# Patient Record
Sex: FEMALE | Race: WHITE | HISPANIC OR LATINO | Employment: FULL TIME | URBAN - METROPOLITAN AREA
[De-identification: names, ages, dates, MRNs, and addresses within clinical notes are randomized per-mention and may not be internally consistent; named-entity substitution may affect disease eponyms.]

---

## 2019-09-19 ENCOUNTER — HOSPITAL ENCOUNTER (EMERGENCY)
Facility: MEDICAL CENTER | Age: 24
End: 2019-09-20
Attending: EMERGENCY MEDICINE
Payer: COMMERCIAL

## 2019-09-19 ENCOUNTER — APPOINTMENT (OUTPATIENT)
Dept: RADIOLOGY | Facility: MEDICAL CENTER | Age: 24
End: 2019-09-19
Attending: EMERGENCY MEDICINE
Payer: COMMERCIAL

## 2019-09-19 ENCOUNTER — HOSPITAL ENCOUNTER (OUTPATIENT)
Facility: MEDICAL CENTER | Age: 24
End: 2019-09-19
Attending: PHYSICIAN ASSISTANT
Payer: COMMERCIAL

## 2019-09-19 ENCOUNTER — OFFICE VISIT (OUTPATIENT)
Dept: URGENT CARE | Facility: CLINIC | Age: 24
End: 2019-09-19
Payer: COMMERCIAL

## 2019-09-19 VITALS
HEIGHT: 60 IN | DIASTOLIC BLOOD PRESSURE: 72 MMHG | OXYGEN SATURATION: 97 % | HEART RATE: 63 BPM | RESPIRATION RATE: 14 BRPM | WEIGHT: 132 LBS | BODY MASS INDEX: 25.91 KG/M2 | TEMPERATURE: 97.5 F | SYSTOLIC BLOOD PRESSURE: 102 MMHG

## 2019-09-19 DIAGNOSIS — R10.9 ABDOMINAL PAIN, UNSPECIFIED ABDOMINAL LOCATION: ICD-10-CM

## 2019-09-19 DIAGNOSIS — R10.9 RIGHT SIDED ABDOMINAL PAIN: ICD-10-CM

## 2019-09-19 DIAGNOSIS — R93.41 ABNORMAL CT SCAN, BLADDER: ICD-10-CM

## 2019-09-19 DIAGNOSIS — R82.90 ABNORMAL URINALYSIS: ICD-10-CM

## 2019-09-19 DIAGNOSIS — R10.30 LOWER ABDOMINAL PAIN: ICD-10-CM

## 2019-09-19 LAB
ALBUMIN SERPL BCP-MCNC: 4.7 G/DL (ref 3.2–4.9)
ALBUMIN/GLOB SERPL: 1.4 G/DL
ALP SERPL-CCNC: 51 U/L (ref 30–99)
ALT SERPL-CCNC: 10 U/L (ref 2–50)
ANION GAP SERPL CALC-SCNC: 7 MMOL/L (ref 0–11.9)
APPEARANCE UR: CLEAR
AST SERPL-CCNC: 12 U/L (ref 12–45)
BASOPHILS # BLD AUTO: 2.6 % (ref 0–1.8)
BASOPHILS # BLD: 0.25 K/UL (ref 0–0.12)
BILIRUB SERPL-MCNC: 0.3 MG/DL (ref 0.1–1.5)
BILIRUB UR STRIP-MCNC: ABNORMAL MG/DL
BUN SERPL-MCNC: 15 MG/DL (ref 8–22)
CALCIUM SERPL-MCNC: 9.3 MG/DL (ref 8.5–10.5)
CHLORIDE SERPL-SCNC: 104 MMOL/L (ref 96–112)
CO2 SERPL-SCNC: 27 MMOL/L (ref 20–33)
COLOR UR AUTO: YELLOW
CREAT SERPL-MCNC: 0.65 MG/DL (ref 0.5–1.4)
EOSINOPHIL # BLD AUTO: 0.16 K/UL (ref 0–0.51)
EOSINOPHIL NFR BLD: 1.7 % (ref 0–6.9)
ERYTHROCYTE [DISTWIDTH] IN BLOOD BY AUTOMATED COUNT: 35.1 FL (ref 35.9–50)
GLOBULIN SER CALC-MCNC: 3.4 G/DL (ref 1.9–3.5)
GLUCOSE SERPL-MCNC: 91 MG/DL (ref 65–99)
GLUCOSE UR STRIP.AUTO-MCNC: ABNORMAL MG/DL
HCT VFR BLD AUTO: 41.3 % (ref 37–47)
HGB BLD-MCNC: 13.9 G/DL (ref 12–16)
INT CON NEG: NEGATIVE
INT CON POS: POSITIVE
KETONES UR STRIP.AUTO-MCNC: ABNORMAL MG/DL
LEUKOCYTE ESTERASE UR QL STRIP.AUTO: ABNORMAL
LYMPHOCYTES # BLD AUTO: 3.12 K/UL (ref 1–4.8)
LYMPHOCYTES NFR BLD: 32.2 % (ref 22–41)
MANUAL DIFF BLD: NORMAL
MCH RBC QN AUTO: 27.3 PG (ref 27–33)
MCHC RBC AUTO-ENTMCNC: 33.7 G/DL (ref 33.6–35)
MCV RBC AUTO: 81.1 FL (ref 81.4–97.8)
MONOCYTES # BLD AUTO: 0.43 K/UL (ref 0–0.85)
MONOCYTES NFR BLD AUTO: 4.4 % (ref 0–13.4)
MORPHOLOGY BLD-IMP: NORMAL
NEUTROPHILS # BLD AUTO: 5.73 K/UL (ref 2–7.15)
NEUTROPHILS NFR BLD: 59.1 % (ref 44–72)
NITRITE UR QL STRIP.AUTO: ABNORMAL
NRBC # BLD AUTO: 0 K/UL
NRBC BLD-RTO: 0 /100 WBC
PH UR STRIP.AUTO: 7.5 [PH] (ref 5–8)
PLATELET # BLD AUTO: 331 K/UL (ref 164–446)
PLATELET BLD QL SMEAR: NORMAL
PMV BLD AUTO: 9.4 FL (ref 9–12.9)
POC URINE PREGNANCY TEST: NORMAL
POTASSIUM SERPL-SCNC: 3.7 MMOL/L (ref 3.6–5.5)
PROT SERPL-MCNC: 8.1 G/DL (ref 6–8.2)
PROT UR QL STRIP: ABNORMAL MG/DL
RBC # BLD AUTO: 5.09 M/UL (ref 4.2–5.4)
RBC BLD AUTO: NORMAL
RBC UR QL AUTO: ABNORMAL
SODIUM SERPL-SCNC: 138 MMOL/L (ref 135–145)
SP GR UR STRIP.AUTO: 1.02
UROBILINOGEN UR STRIP-MCNC: ABNORMAL MG/DL
WBC # BLD AUTO: 9.7 K/UL (ref 4.8–10.8)

## 2019-09-19 PROCEDURE — 74177 CT ABD & PELVIS W/CONTRAST: CPT

## 2019-09-19 PROCEDURE — 81025 URINE PREGNANCY TEST: CPT | Performed by: PHYSICIAN ASSISTANT

## 2019-09-19 PROCEDURE — 99284 EMERGENCY DEPT VISIT MOD MDM: CPT

## 2019-09-19 PROCEDURE — 99204 OFFICE O/P NEW MOD 45 MIN: CPT | Performed by: PHYSICIAN ASSISTANT

## 2019-09-19 PROCEDURE — 85027 COMPLETE CBC AUTOMATED: CPT

## 2019-09-19 PROCEDURE — 87186 SC STD MICRODIL/AGAR DIL: CPT

## 2019-09-19 PROCEDURE — 87086 URINE CULTURE/COLONY COUNT: CPT

## 2019-09-19 PROCEDURE — 87077 CULTURE AEROBIC IDENTIFY: CPT

## 2019-09-19 PROCEDURE — 85007 BL SMEAR W/DIFF WBC COUNT: CPT

## 2019-09-19 PROCEDURE — 81002 URINALYSIS NONAUTO W/O SCOPE: CPT | Performed by: PHYSICIAN ASSISTANT

## 2019-09-19 PROCEDURE — 80053 COMPREHEN METABOLIC PANEL: CPT

## 2019-09-19 PROCEDURE — 700117 HCHG RX CONTRAST REV CODE 255: Performed by: EMERGENCY MEDICINE

## 2019-09-19 RX ADMIN — IOHEXOL 100 ML: 350 INJECTION, SOLUTION INTRAVENOUS at 23:48

## 2019-09-19 ASSESSMENT — LIFESTYLE VARIABLES
TOTAL SCORE: 0
DO YOU DRINK ALCOHOL: NO
HAVE PEOPLE ANNOYED YOU BY CRITICIZING YOUR DRINKING: NO
HAVE YOU EVER FELT YOU SHOULD CUT DOWN ON YOUR DRINKING: NO
TOTAL SCORE: 0
CONSUMPTION TOTAL: INCOMPLETE
TOTAL SCORE: 0
EVER FELT BAD OR GUILTY ABOUT YOUR DRINKING: NO
EVER HAD A DRINK FIRST THING IN THE MORNING TO STEADY YOUR NERVES TO GET RID OF A HANGOVER: NO
DOES PATIENT WANT TO STOP DRINKING: NO

## 2019-09-19 ASSESSMENT — ENCOUNTER SYMPTOMS
VOMITING: 0
NAUSEA: 0
DIARRHEA: 0
CONSTIPATION: 0
FEVER: 0
VOMITING: 0
BACK PAIN: 0
CONSTIPATION: 0
FLANK PAIN: 0
ABDOMINAL PAIN: 1
ABDOMINAL PAIN: 1

## 2019-09-19 NOTE — LETTER
9/23/2019               Gabriela Handy  [address] [place] [address] [address]  [place] [address]        Dear Gabriela (MR#[id number])    This letter is sent in regards to your, recent visit to the [facility] Emergency Department on 9/19/2019.  During the visit, tests were performed to assist the physician in a medical diagnosis.  A review of those tests requires that we notify you of the following:    Your urine culture was POSITIVE for a bacteria called Escherichia coli. The antibiotic prescribed for you (nitrofurantoin) should be active to treat this bacteria. IT IS IMPORTANT THAT YOU CONTINUE TAKING YOUR ANTIBIOTIC UNTIL IT IS FINISHED.      Please feel free to contact me at the number below if you have any questions or concerns. Thank you for your cooperation in the matter.    Sincerely,  ED Culture Follow-Up Staff  Valeria March, PharmD    [facility], Emergency Department  [address] [place] [id number] [phone] (ED Culture Line)  [phone]

## 2019-09-20 VITALS
HEART RATE: 81 BPM | BODY MASS INDEX: 27.48 KG/M2 | TEMPERATURE: 96.8 F | HEIGHT: 60 IN | RESPIRATION RATE: 16 BRPM | SYSTOLIC BLOOD PRESSURE: 121 MMHG | WEIGHT: 140 LBS | DIASTOLIC BLOOD PRESSURE: 81 MMHG | OXYGEN SATURATION: 98 %

## 2019-09-20 DIAGNOSIS — R10.30 LOWER ABDOMINAL PAIN: ICD-10-CM

## 2019-09-20 PROCEDURE — 87077 CULTURE AEROBIC IDENTIFY: CPT

## 2019-09-20 PROCEDURE — 87086 URINE CULTURE/COLONY COUNT: CPT

## 2019-09-20 PROCEDURE — 700102 HCHG RX REV CODE 250 W/ 637 OVERRIDE(OP): Performed by: EMERGENCY MEDICINE

## 2019-09-20 PROCEDURE — 87186 SC STD MICRODIL/AGAR DIL: CPT

## 2019-09-20 PROCEDURE — A9270 NON-COVERED ITEM OR SERVICE: HCPCS | Performed by: EMERGENCY MEDICINE

## 2019-09-20 RX ORDER — NITROFURANTOIN 25; 75 MG/1; MG/1
100 CAPSULE ORAL 2 TIMES DAILY
Qty: 10 CAP | Refills: 0 | Status: SHIPPED | OUTPATIENT
Start: 2019-09-20 | End: 2019-09-24

## 2019-09-20 RX ORDER — NITROFURANTOIN 25; 75 MG/1; MG/1
100 CAPSULE ORAL ONCE
Status: COMPLETED | OUTPATIENT
Start: 2019-09-20 | End: 2019-09-20

## 2019-09-20 RX ADMIN — NITROFURANTOIN MONOHYDRATE/MACROCRYSTALLINE 100 MG: 25; 75 CAPSULE ORAL at 01:04

## 2019-09-20 NOTE — ED NOTES
Patient awake alert and oriented x 4, Glascow 15, bed in low position, call light within reach, on room air, attached to cardiac monitor, unlabored breathing noted, no cough noted, interacts with staff, interactions noted as appropriate, awaiting CT scan, labs sent for processing.   verbal instruction/individual instruction

## 2019-09-20 NOTE — ED NOTES
Patient awake alert and oriented x 4, Glascow 15, bed in low position, call light within reach, on room air, attached to cardiac monitor, unlabored breathing noted, no cough noted, interacts with staff, interactions noted as appropriate, watching television, awaiting CT scan results.

## 2019-09-20 NOTE — PROGRESS NOTES
"Subjective:      Gabriela Handy is a 24 y.o. female who presents with RLQ Pain (since yesterday )            Patient is a 24-year-old female who presents to urgent care with 2 days of right lower quadrant abdominal pain.  However she believes that it started a little higher up however over the last day has not radiated closer to her lower abdomen.  She is currently on her menstrual cycle of which she reports started yesterday however this pain is \"different \".  She does report standard abdominal cramps of which this is sharp and persistent.  She denies any nausea vomiting, diarrhea or constipation.  She denies any vaginal symptoms or urinary symptoms.  Last bowel movement was 2 hours ago and did not make a difference.  She denies prior abdominal surgeries.    RLQ Pain   This is a new problem. The current episode started yesterday. The onset quality is gradual. The problem occurs constantly. The problem has been gradually worsening. The pain is located in the RLQ and RUQ. The pain is at a severity of 5/10. The pain is moderate. The quality of the pain is sharp. Pertinent negatives include no constipation, diarrhea, dysuria, fever, frequency, hematuria, nausea or vomiting. The pain is aggravated by certain positions. She has tried nothing for the symptoms. There is no history of abdominal surgery.       Review of Systems   Constitutional: Negative for fever.   Gastrointestinal: Positive for abdominal pain. Negative for constipation, diarrhea, nausea and vomiting.   Genitourinary: Negative for dysuria, flank pain, frequency, hematuria and urgency.   Musculoskeletal: Negative for back pain.   Skin: Negative for rash.   All other systems reviewed and are negative.         Objective:     /72 (BP Location: Left arm, Patient Position: Sitting, BP Cuff Size: Adult)   Pulse 63   Temp 36.4 °C (97.5 °F) (Temporal)   Resp 14   Ht 1.524 m (5')   Wt 59.9 kg (132 lb)   LMP 09/19/2019   SpO2 97%   BMI 25.78 kg/m² "      Physical Exam   Constitutional: She is oriented to person, place, and time. She appears well-developed and well-nourished. No distress.   HENT:   Head: Normocephalic and atraumatic.   Eyes: Pupils are equal, round, and reactive to light. Conjunctivae and EOM are normal.   Neck: Normal range of motion. Neck supple. No tracheal deviation present.   Cardiovascular: Normal rate.   No murmur heard.  Pulmonary/Chest: Effort normal. No respiratory distress.   Abdominal: Bowel sounds are normal. There is tenderness in the right upper quadrant and right lower quadrant. There is no rigidity, no guarding and negative Rosas's sign.       Neurological: She is alert and oriented to person, place, and time. Coordination normal.   Skin: Skin is warm. No rash noted.   Psychiatric: She has a normal mood and affect. Her behavior is normal. Judgment and thought content normal.   Vitals reviewed.  negative heel tap.          HCG- negative.    Assessment/Plan:     1. Right sided abdominal pain  2. Lower abdominal pain  - POCT Pregnancy  - POCT Urinalysis  - URINE CULTURE(NEW); Future    UA reveals blood and positive nitrates.  Patient without urinary symptoms will send for culture at this time.  Patient is with notable right sided periumbilical along with right lower quadrant abdominal tenderness-unfortunately this time the evening ultrasound or CT is not readily available.  I do feel that this patient needs timely imaging and needs higher level of care at this time.  Waiting till tomorrow potentially could be delaying care.  Patient is agreeable to have further evaluation in the emergency room.  Patient is to remain n.p.o. until cleared for such.  Patient left with her significant other POV for further evaluation in the emergency room.  She is uncertain which when she will present to however is agreeable.  Patient left in stable condition for further evaluation.  Please note that this dictation was created using voice recognition  software. I have made every reasonable attempt to correct obvious errors, but I expect that there are errors of grammar and possibly content that I did not discover before finalizing the note.

## 2019-09-20 NOTE — ED NOTES
Patient verbalized understanding of discharge instructions, provided with discharge paperwork, gait steady, ambulated independently to DEE kiser.

## 2019-09-20 NOTE — ED NOTES
Patient awake alert and oriented x 4, Glascow 15, bed in low position, call light within reach, on room air, attached to cardiac monitor, unlabored breathing noted, no cough noted, interacts with staff, interactions noted as appropriate, awaiting CT scan, friend at bedside.

## 2019-09-20 NOTE — ED PROVIDER NOTES
"ED Provider Note    Scribed for ANN-MARIE Copeland II* by Franike Pulliam. 9/19/2019  10:16 PM    Means of Arrival: Walk-in  History obtained by: Patient  Limitations: None    CHIEF COMPLAINT  Chief Complaint   Patient presents with   • Abdominal Pain       HPI  Gabriela Handy is a 24 y.o. female who presents to the Emergency Department with worsening, moderate right sided abdominal pain described as \"sharp\" onset 1.5 days ago. Gabriela states that her abdominal pain started yesterday morning, but she did not seek medical intervention until her pain began to worsen earlier today and she had concerns for appendicitis. She was seen at [facility] earlier today, and urine test showed abnormal nitrites. Gabriela denies any associated constipation, decreased appetite, abnormal vaginal bleeding, increased urinary frequency, dysuria, or vomiting. Pushing on her abdomen and eating do not exacerbate her pain, although sitting up does. There are not known alleviating factors. She is currently menstruating at this time. There is a history of ovarian cancer through her mother.       REVIEW OF SYSTEMS  Review of Systems   Gastrointestinal: Positive for abdominal pain. Negative for constipation and vomiting.        Negative for decreased appetite.    Genitourinary: Negative for dysuria, frequency and urgency.        Negative for abnormal vagina bleeding.      See HPI for further details.    PAST MEDICAL HISTORY       SOCIAL HISTORY  Social History     Tobacco Use   • Smoking status: Never Smoker   • Smokeless tobacco: Never Used   Substance and Sexual Activity   • Alcohol use: Yes   • Drug use: Not Currently   • Sexual activity: Not on file       SURGICAL HISTORY  patient denies any surgical history    CURRENT MEDICATIONS  No current outpatient medications noted    ALLERGIES  No Known Allergies    PHYSICAL EXAM  VITAL SIGNS: /83   Pulse 77   Temp 36 °C (96.8 °F) (Temporal)   Resp 16   Ht 1.524 m (5')   Wt 63.5 kg " (140 lb)   LMP 09/19/2019   SpO2 99%   BMI 27.34 kg/m²     Pulse ox interpretation: I interpret this pulse ox as normal.  Constitutional: Alert in no apparent distress.   HENT: No signs of trauma, Bilateral external ears normal, Nose normal.   Eyes: Pupils are equal, Conjunctiva normal, Non-icteric.   Neck: Normal range of motion, No tenderness, Supple, No stridor.   Cardiovascular: Regular rate and rhythm, no murmurs. Symmetric distal pulses. No cyanosis of extremities. No peripheral edema of extremities.  Thorax & Lungs: Normal breath sounds, No respiratory distress, No wheezing, No chest tenderness.   Abdomen: Tenderness of RLQ with minimal to mild guarding. Bowel sounds normal, Soft, No masses, No pulsatile masses. No peritoneal signs.  Skin: Warm, Dry, No erythema, No rash.   Back: No midline bony tenderness, No CVA tenderness.   Musculoskeletal: Good range of motion in all major joints. No tenderness to palpation or major deformities noted.   Neurologic: Alert , Normal motor function, Normal sensory function, No focal deficits noted.   Psychiatric: Affect normal, Judgment normal, Mood normal.      DIAGNOSTIC STUDIES / PROCEDURES    LABS  Pertinent Labs & Imaging studies reviewed. (See chart for details)    RADIOLOGY  CT-ABDOMEN-PELVIS WITH   Final Result         1.  No acute abnormality.   2.  Focal area of thickening of the anterior bladder wall, of uncertain significance. Urologic referral for workup and management.   3.  Hepatomegaly        Pertinent Labs & Imaging studies reviewed. (See chart for details)    COURSE & MEDICAL DECISION MAKING  Pertinent Labs & Imaging studies reviewed. (See chart for details)    10:16 PM This is a 24 y.o. female who presents with right-sided abdominal pain and the differential diagnosis includes but is not limited to appendicitis, ovarian cysts, lower likelihood of ovarian torsion, kidney stones, constipation, and UTI. Ordered for CT-abdomen-pelvis, CMP, and CBC with diff  to evaluate.     12:44 AM  CT does not show any evidence of appendicitis.  Lab work unremarkable.  There are findings of thickening at the anterior wall of the bladder.  Given nitrite positive urine from urine dip at urgent care this finding could be consistent with cystitis in my opinion.  However he could also be findings of mass.  I have talked her about this and I want her to follow-up with primary care provider.  I have provided her with the report of the CT scan.  I am also going to empirically treat for cystitis.  Cultures are pending.     The patient will return for worsening symptoms and is stable at the time of discharge. The patient verbalizes understanding and will comply. Guidance provided on appropriate use of medications.    FINAL IMPRESSION  1. Abnormal urinalysis Active   2. Abdominal pain, unspecified abdominal location    3. Abnormal CT scan, bladder          Frankie BELTRAN (Rian), am scribing for, and in the presence of, ELGIN Copeland II.    Electronically signed by: Frankie Pulliam (Rian), 9/19/2019    IJeremiah II, M* personally performed the services described in this documentation, as scribed by Frankie Pulliam in my presence, and it is both accurate and complete.    C.    The note accurately reflects work and decisions made by me.  Jeremiah Slater II  9/20/2019  12:45 AM

## 2019-09-20 NOTE — ED NOTES
Patient awake alert and oriented x 4, Glacow 15, bed in low position, call light within reach, on room air, unlabored breathing noted, no cough noted, interacts with staff, interactions noted as appropriate, friend at bedside, frequent rounding performed, will continue to assess patient.

## 2019-09-20 NOTE — ED TRIAGE NOTES
C/o sharp right lower quad pain x1 day, sent from urgent care for CT scan.  No other complaints reported.

## 2019-09-22 LAB
BACTERIA UR CULT: ABNORMAL
SIGNIFICANT IND 70042: ABNORMAL
SIGNIFICANT IND 70042: ABNORMAL
SITE SITE: ABNORMAL
SITE SITE: ABNORMAL
SOURCE SOURCE: ABNORMAL
SOURCE SOURCE: ABNORMAL

## 2019-09-23 NOTE — ED NOTES
ED Positive Culture Follow-up/Notification Note:    Date: 9/23/19     Patient seen in the ED on 9/19/2019 for right sided abdominal pain. Focal thickening of the bladder wall seen on the CT of abdomen.   1. Abnormal urinalysis Active   2. Abdominal pain, unspecified abdominal location    3. Abnormal CT scan, bladder       Discharge Medication List as of 9/20/2019 12:57 AM      START taking these medications    Details   nitrofurantoin monohyd macro (MACROBID) 100 MG Cap Take 1 Cap by mouth 2 times a day for 5 days., Disp-10 Cap, R-0, Print Rx Paper             Allergies: Patient has no known allergies.     Vitals:    09/19/19 2300 09/19/19 2330 09/20/19 0000 09/20/19 0106   BP: (!) 99/64 110/75 (!) 97/62 121/81   Pulse: 64 81 80 81   Resp: 19 18 16 16   Temp:       TempSrc:       SpO2: 96% 94% 97% 98%   Weight:       Height:           Final cultures:   Results     Procedure Component Value Units Date/Time    URINE CULTURE(NEW) [[id number]]  (Abnormal)  (Susceptibility) Collected:  09/20/19 0045    Order Status:  Completed Specimen:  Urine, Clean Catch Updated:  09/22/19 1552     Significant Indicator POS     Source UR     Site URINE, CLEAN CATCH     Culture Result -      Escherichia coli  >100,000 cfu/mL      Narrative:       Indication for culture:->Patient WITHOUT an indwelling Hogan  catheter in place with new onset of Dysuria, Frequency,  Urgency, and/or Suprapubic pain  Indication for culture:->Patient WITHOUT an indwelling Hogan    Susceptibility     Escherichia coli (1)     Antibiotic Interpretation Microscan Method Status    Ceftriaxone Sensitive <=1 mcg/mL WILLIAM Final    Ceftazidime Sensitive <=1 mcg/mL WILLIAM Final    Cefotaxime Sensitive <=2 mcg/mL WILLIAM Final    Cefazolin Sensitive <=2 mcg/mL WILLIAM Final    Ciprofloxacin Sensitive <=1 mcg/mL WILLIAM Final    Cefepime Sensitive <=2 mcg/mL WILLIAM Final    Ampicillin Sensitive <=8 mcg/mL WILLIAM Final    Cefotetan Sensitive <=16 mcg/mL WILLIAM Final    Tobramycin Sensitive <=4  mcg/mL WILLIAM Final    Nitrofurantoin Sensitive <=32 mcg/mL WILLIAM Final    Gentamicin Sensitive <=4 mcg/mL WILLIAM Final    Levofloxacin Sensitive <=2 mcg/mL WILLIAM Final    Pip/Tazobactam Sensitive <=16 mcg/mL WILLIAM Final    Trimeth/Sulfa Sensitive <=2/38 mcg/mL WILLIAM Final                         Plan:   Appropriate antibiotic therapy prescribed. No changes required based upon culture result.  Sent letter to patient to notify of positive culture result and encourage compliance with prescribed antibiotics.     Valeria March

## 2019-09-24 ENCOUNTER — OFFICE VISIT (OUTPATIENT)
Dept: MEDICAL GROUP | Facility: MEDICAL CENTER | Age: 24
End: 2019-09-24
Payer: COMMERCIAL

## 2019-09-24 VITALS
DIASTOLIC BLOOD PRESSURE: 68 MMHG | OXYGEN SATURATION: 98 % | HEIGHT: 60 IN | RESPIRATION RATE: 16 BRPM | WEIGHT: 134.48 LBS | HEART RATE: 78 BPM | BODY MASS INDEX: 26.4 KG/M2 | SYSTOLIC BLOOD PRESSURE: 102 MMHG | TEMPERATURE: 98.7 F

## 2019-09-24 DIAGNOSIS — H92.02 LEFT EAR PAIN: ICD-10-CM

## 2019-09-24 DIAGNOSIS — Z01.419 WOMEN'S ANNUAL ROUTINE GYNECOLOGICAL EXAMINATION: ICD-10-CM

## 2019-09-24 DIAGNOSIS — Z76.89 ENCOUNTER TO ESTABLISH CARE: ICD-10-CM

## 2019-09-24 PROCEDURE — 99213 OFFICE O/P EST LOW 20 MIN: CPT | Performed by: PHYSICIAN ASSISTANT

## 2019-09-24 ASSESSMENT — PATIENT HEALTH QUESTIONNAIRE - PHQ9: CLINICAL INTERPRETATION OF PHQ2 SCORE: 0

## 2019-09-25 NOTE — ASSESSMENT & PLAN NOTE
This is a 24-year-old female who is here today to establish care.  Was seen recently for a urinary tract infection.  Treated with Macrobid.  Symptoms improved.  She was dealing with abdominal pain.  Denies any current fevers.  Does have left ear discomfort as well as decreased hearing.  Symptoms began as soon as she was swimming at [employer].  Complains of some drainage.  2 weeks of hearing loss.  Denies radiation of pain.  She has not had a Pap smear.  Is sexually active.  Has a boyfriend.  No children.  Parents are alive and well.  Has 1 brother who is younger.

## 2019-09-25 NOTE — PROGRESS NOTES
Subjective:   Gabriela Handy is a 24 y.o. female here today for left ear pain for 2 weeks and to establish care.  Also recent history of UTI.    Left ear pain  This is a 24-year-old female who is here today to establish care.  Was seen recently for a urinary tract infection.  Treated with Macrobid.  Symptoms improved.  She was dealing with abdominal pain.  Denies any current fevers.  Does have left ear discomfort as well as decreased hearing.  Symptoms began as soon as she was swimming at [employer].  Complains of some drainage.  2 weeks of hearing loss.  Denies radiation of pain.  She has not had a Pap smear.  Is sexually active.  Has a boyfriend.  No children.  Parents are alive and well.  Has 1 brother who is younger.       Current medicines (including changes today)  No current outpatient medications on file.     No current facility-administered medications for this visit.      She  has no past medical history on file.    Social History and Family History were reviewed and updated.    ROS   No chest pain, no shortness of breath, no abdominal pain and all other systems were reviewed and are negative.       Objective:     /68 (BP Location: Left arm, Patient Position: Sitting, BP Cuff Size: Adult)   Pulse 78   Temp 37.1 °C (98.7 °F) (Temporal)   Resp 16   Ht 1.524 m (5')   Wt 61 kg (134 lb 7.7 oz)   SpO2 98%  Body mass index is 26.26 kg/m².   Physical Exam:  Constitutional: Alert, no distress.  Skin: Warm, dry, good turgor, no rashes in visible areas.  Eye: Equal, round and reactive, conjunctiva clear, lids normal.  ENMT: Lips without lesions, good dentition, oropharynx clear.  Left ear with cerumen impaction.  Cleared after lavage.  Tolerated.  Right side TM is clear.  Neck: Trachea midline, no masses.   Lymph: No cervical or supraclavicular lymphadenopathy  Respiratory: Unlabored respiratory effort, lungs clear to auscultation, no wheezes, no ronchi.  Cardiovascular: Normal S1, S2, no murmur,  no edema.  Psych: Alert and oriented x3, normal affect and mood.        Assessment and Plan:   The following treatment plan was discussed    1. Left ear pain  Acute, new onset condition.  Secondary to wax impaction.  Symptoms resolved after lavage.  We will continue to monitor.    2. Women's annual routine gynecological examination  Refer to gynecology for Pap smear.  - REFERRAL TO GYNECOLOGY    3. Encounter to establish care      Followup: Return in about 3 months (around 12/24/2019), or if symptoms worsen or fail to improve.    Please note that this dictation was created using voice recognition software. I have made every reasonable attempt to correct obvious errors, but I expect that there are errors of grammar and possibly content that I did not discover before finalizing the note.

## 2020-09-24 ENCOUNTER — OFFICE VISIT (OUTPATIENT)
Dept: MEDICAL GROUP | Facility: MEDICAL CENTER | Age: 25
End: 2020-09-24
Payer: COMMERCIAL

## 2020-09-24 VITALS
OXYGEN SATURATION: 97 % | HEART RATE: 76 BPM | BODY MASS INDEX: 27.27 KG/M2 | HEIGHT: 60 IN | DIASTOLIC BLOOD PRESSURE: 58 MMHG | SYSTOLIC BLOOD PRESSURE: 102 MMHG | TEMPERATURE: 98.6 F | RESPIRATION RATE: 16 BRPM | WEIGHT: 138.89 LBS

## 2020-09-24 DIAGNOSIS — R23.4 BREAST SKIN CHANGES: ICD-10-CM

## 2020-09-24 PROBLEM — H92.02 LEFT EAR PAIN: Status: RESOLVED | Noted: 2019-09-24 | Resolved: 2020-09-24

## 2020-09-24 PROCEDURE — 99214 OFFICE O/P EST MOD 30 MIN: CPT | Performed by: PHYSICIAN ASSISTANT

## 2020-09-24 RX ORDER — CEPHALEXIN 500 MG/1
1000 CAPSULE ORAL 2 TIMES DAILY
Qty: 28 CAP | Refills: 0 | Status: SHIPPED | OUTPATIENT
Start: 2020-09-24 | End: 2020-10-01

## 2020-09-24 ASSESSMENT — FIBROSIS 4 INDEX: FIB4 SCORE: 0.28

## 2020-09-24 ASSESSMENT — PATIENT HEALTH QUESTIONNAIRE - PHQ9: CLINICAL INTERPRETATION OF PHQ2 SCORE: 0

## 2020-09-24 NOTE — PROGRESS NOTES
Subjective:   Gabriela Handy is a 25 y.o. female here today for left breast mass and skin changes over the past 3 weeks.    Breast skin changes  This is a 25-year-old female comes in today complaining of a left breast mass for approximately 3 weeks.  States she can now see it in the mirror.  Initially it was not painful but became painful about a week ago.  No family history of breast cancer.  No history of any personal breast lesions.  Denies any lactation.  No other concerns today.       Current medicines (including changes today)  Current Outpatient Medications   Medication Sig Dispense Refill   • cephALEXin (KEFLEX) 500 MG Cap Take 2 Caps by mouth 2 times a day for 7 days. 28 Cap 0     No current facility-administered medications for this visit.      She  has no past medical history on file.    Social History and Family History were reviewed and updated.    ROS  No chest pain, no shortness of breath, no abdominal pain and all other systems were reviewed and are negative.       Objective:     /58   Pulse 76   Temp 37 °C (98.6 °F) (Temporal)   Resp 16   Ht 1.524 m (5')   Wt 63 kg (138 lb 14.2 oz)   SpO2 97%  Body mass index is 27.13 kg/m².  Physical Exam:  Constitutional: Alert, no distress.  Skin: Warm, dry, good turgor, no rashes in visible areas.  Left breast with no nipple inversion or any other dimpling noted of the breast.  Approximate 6 o'clock position there is a subcutaneous lesion which is hard possibly indurated.  Slightly tender.  Circular hyperpigmentation noted above the lesion.  Eye: Equal, round and reactive, conjunctiva clear, lids normal.  ENMT: Lips without lesions, good dentition, oropharynx clear.  Neck: Trachea midline, no masses.   Lymph: No cervical or supraclavicular lymphadenopathy  Respiratory: Unlabored respiratory effort, lungs appear clear, no wheezes.  Cardiovascular: Regular rate and rhythm.  Psych: Alert and oriented x3, normal affect and mood.    Chaperoned by  Marta.    Assessment and Plan:   The following treatment plan was discussed    1. Breast skin changes  Acute, new onset condition.  Appears to be a possible infection.  Provided Keflex as directed.  Contact me through my chart with any changes.  If the lesion is not improving after 2 weeks she is to contact the imaging for diagnostic mammogram.  Added ultrasound if needed.  - cephALEXin (KEFLEX) 500 MG Cap; Take 2 Caps by mouth 2 times a day for 7 days.  Dispense: 28 Cap; Refill: 0  - MA-DIAGNOSTIC MAMMO BILAT W/O CAD; Future      Followup: Return if symptoms worsen or fail to improve.    Please note that this dictation was created using voice recognition software. I have made every reasonable attempt to correct obvious errors, but I expect that there are errors of grammar and possibly content that I did not discover before finalizing the note.

## 2020-09-24 NOTE — ASSESSMENT & PLAN NOTE
This is a 25-year-old female comes in today complaining of a left breast mass for approximately 3 weeks.  States she can now see it in the mirror.  Initially it was not painful but became painful about a week ago.  No family history of breast cancer.  No history of any personal breast lesions.  Denies any lactation.  No other concerns today.

## 2020-10-03 NOTE — DISCHARGE INSTRUCTIONS
CT-ABDOMEN-PELVIS WITH (Final result)   Result time 09/20/19 00:25:22   Final result                Impression:        1.  No acute abnormality.  2.  Focal area of thickening of the anterior bladder wall, of uncertain significance. Urologic referral for workup and management.  3.  Hepatomegaly            Narrative:      9/19/2019 11:11 PM    HISTORY/REASON FOR EXAM: Abdominal infection suspected; RLQ pain    TECHNIQUE/EXAM DESCRIPTION:  CT abdomen and pelvis with contrast. Sequential axial CT images were obtained from lung bases to the proximal femurs after the uneventful administration of 100 cc Omnipaque 350 intravenous contrast.    Low dose optimization technique was utilized for this CT exam including automated exposure control and adjustment of the mA and/or kV according to patient size.    COMPARISON:  None    CT ABDOMEN FINDINGS:    Limited views of the lungs appear within physiologic limits.    The liver is normal in contour. Hepatomegaly is observed. No intrahepatic biliary ductal dilatation is noted.    The gallbladder appears within normal limits.    The spleen is unremarkable.    The pancreas is grossly normal.    Bilateral adrenal glands appear within normal limits.    The kidneys are unremarkable.  The ureters are normal in caliber along their visualized course.    The colon appears within normal limits. The appendix appears within normal limits. The small bowel is unremarkable.    The bony structures are age appropriate.    CT PELVIS FINDINGS:    Focal area of thickening of the anterior bladder wall is seen. The uterus and adnexal structures appear within physiologic limits.               acetaminophen 325 mg oral tablet: 2 tab(s) orally every 6 hours, As needed, Mild Pain (1 - 3)  ibuprofen 600 mg oral tablet: 1 tab(s) orally every 8 hours, As needed, Moderate Pain (4 - 6)  oxyCODONE 5 mg oral tablet: 1 tab(s) orally every 8 hours, As Needed -for severe pain MDD:3 tabs

## 2021-01-26 ENCOUNTER — HOSPITAL ENCOUNTER (OUTPATIENT)
Facility: MEDICAL CENTER | Age: 26
End: 2021-01-26
Attending: OBSTETRICS & GYNECOLOGY
Payer: COMMERCIAL

## 2021-01-26 ENCOUNTER — GYNECOLOGY VISIT (OUTPATIENT)
Dept: OBGYN | Facility: CLINIC | Age: 26
End: 2021-01-26
Payer: COMMERCIAL

## 2021-01-26 VITALS
SYSTOLIC BLOOD PRESSURE: 112 MMHG | HEIGHT: 60 IN | DIASTOLIC BLOOD PRESSURE: 69 MMHG | BODY MASS INDEX: 28.29 KG/M2 | WEIGHT: 144.1 LBS

## 2021-01-26 DIAGNOSIS — Z12.4 SCREENING FOR CERVICAL CANCER: ICD-10-CM

## 2021-01-26 DIAGNOSIS — Z30.09 ENCOUNTER FOR OTHER GENERAL COUNSELING OR ADVICE ON CONTRACEPTION: ICD-10-CM

## 2021-01-26 DIAGNOSIS — Z01.419 WELL WOMAN EXAM: ICD-10-CM

## 2021-01-26 DIAGNOSIS — Z11.3 SCREEN FOR SEXUALLY TRANSMITTED DISEASES: ICD-10-CM

## 2021-01-26 DIAGNOSIS — Z12.39 ENCOUNTER FOR BREAST CANCER SCREENING USING NON-MAMMOGRAM MODALITY: ICD-10-CM

## 2021-01-26 PROCEDURE — 88175 CYTOPATH C/V AUTO FLUID REDO: CPT

## 2021-01-26 PROCEDURE — 87591 N.GONORRHOEAE DNA AMP PROB: CPT

## 2021-01-26 PROCEDURE — 99385 PREV VISIT NEW AGE 18-39: CPT | Performed by: OBSTETRICS & GYNECOLOGY

## 2021-01-26 PROCEDURE — 87491 CHLMYD TRACH DNA AMP PROBE: CPT

## 2021-01-26 SDOH — HEALTH STABILITY: MENTAL HEALTH: HOW OFTEN DO YOU HAVE A DRINK CONTAINING ALCOHOL?: MONTHLY OR LESS

## 2021-01-26 ASSESSMENT — FIBROSIS 4 INDEX: FIB4 SCORE: 0.28

## 2021-01-26 NOTE — PROGRESS NOTES
Well woman visit     Gabriela Handy is a 25 y.o. G0 who presents to \[facility]\"" care and for her Annual Exam.  She has never had a Pap test and therefore she presents for that today.  She is in a monogamous relationship but is interested in getting STD testing.  For contraception she and her partner have intermittently been using condoms.  She does not desire pregnancy.  Denies any other complaints today.  Denies any abnormal vaginal bleeding, vaginal discharge, breast concerns, urinary or bowel complaints.    GYN History:  LMP 1/5/21.  Menarche @12.  Menses regular, lasting 4-7 days, not particularly heavy.  Last pap never.  No history of cone biopsy, LEEP or any other cervical, uterine or gynecologic surgery. No history of sexually transmitted diseases.  Currently sexually active with one male partner.  Utilizing condoms intermittently for contraception and has used nothing else in the past.     OB History:    OB History   No obstetric history on file.       Health Maintenance:  Immunizations: up to date - unsure about HPV    Review of Systems:   Pertinent positives documented in HPI and all other systems reviewed & are negative.    All PMH, PSH, allergies, social history and FH reviewed and updated today:  Past Medical History:  History reviewed. No pertinent past medical history.    Past Surgical History:  History reviewed. No pertinent surgical history.    Medications:   No current [facility]-ordered outpatient medications on file.     No current [facility]-ordered facility-administered medications on file.        Allergies: Patient has no known allergies.    Social History:  Social History     Socioeconomic History   • Marital status: Single     Spouse name: Not on file   • Number of children: Not on file   • Years of education: Not on file   • Highest education level: Not on file   Occupational History   • Not on file   Social Needs   • Financial resource strain: Not on file   • Food insecurity     Worry: Not on file      Inability: Not on file   • Transportation needs     Medical: Not on file     Non-medical: Not on file   Tobacco Use   • Smoking status: Never Smoker   • Smokeless tobacco: Never Used   Substance and Sexual Activity   • Alcohol use: Yes     Frequency: Monthly or less     Comment: 3-4 beers 3 times monthly   • Drug use: Not Currently   • Sexual activity: Yes     Partners: Male     Comment: BF, no children, IGT   Lifestyle   • Physical activity     Days per week: Not on file     Minutes per session: Not on file   • Stress: Not on file   Relationships   • Social connections     Talks on phone: Not on file     Gets together: Not on file     Attends Muslim service: Not on file     Active member of club or organization: Not on file     Attends meetings of clubs or organizations: Not on file     Relationship status: Not on file   • Intimate partner violence     Fear of current or ex partner: Not on file     Emotionally abused: Not on file     Physically abused: Not on file     Forced sexual activity: Not on file   Other Topics Concern   • Not on file   Social History Narrative   • Not on file       Family History:  History reviewed. No pertinent family history.        Objective:   Vitals:  /69   Ht 1.524 m (5')   Wt 65.4 kg (144 lb 1.6 oz)   Body mass index is 28.14 kg/m². (Goal BM I>18 <25)    Physical Exam:   Nursing note and vitals reviewed.  Physical Exam   Constitutional: She is oriented to person, place, and time and well-developed, well-nourished, and in no distress.   HENT:   Head: Normocephalic and atraumatic.   Eyes: Pupils are equal, round, and reactive to light.   Neck: Normal range of motion. Neck supple. No thyromegaly present.   Cardiovascular: Intact distal pulses.   Pulmonary/Chest: Effort normal. Right breast exhibits no inverted nipple, no mass, no nipple discharge, no skin change and no tenderness. Left breast exhibits no inverted nipple, no mass, no nipple discharge, no skin change and no  tenderness.   Abdominal: Soft.   Musculoskeletal: Normal range of motion.   Neurological: She is alert and oriented to person, place, and time. Gait normal.   Skin: Skin is warm and dry.   Psychiatric: Mood, memory, affect and judgment normal.     Genitourinary:  Normal appearing external female genitalia without any rashes, lesions, labial fusion or tenderness.  Normal appearing urethral meatus with no lesions or prolapse, normal urethra with no masses/tenderness.  Vagina is pink moist and well rugated. Physiologic discharge present within vaginal vault.  Cervix well visualized without masses or lesions.  Normal appearing anus with no visible hemorrhoids. On bimanual exam, bladder is non tender, there is no cervical motion tenderness, uterus is anteverted, not enlarged, fixed, or tender.  No adnexal masses or tenderness.      Assessment/Plan:     1. Well woman exam     2. Screening for cervical cancer  THINPREP RFLX HPV ASCUS W/CTNG   3. Screen for sexually transmitted diseases  THINPREP RFLX HPV ASCUS W/CTNG    HIV AG/AB COMBO ASSAY SCREENING    RPR (SYPHILIS)   4. Encounter for breast cancer screening using non-mammogram modality     5. Encounter for other general counseling or advice on contraception         Gabriela Handy is a 25 y.o. G0 female who presents for her annual gynecologic exam.   # Preventative health care:   --Breast self awareness discussed. Mammogram not yet indicated (annually starting at age 40).  --Cervical cancer screening.  First Pap collected today. HPV reflex sent. I will follow up with patient once results are back as per ASCCP guidelines.   --Immunizations are up to date but patient is unsure if she had the HPV vaccination.  She will check on this and let us know if she would like to receive it.  --Diet, exercise, vitamin supplementation, and hydration discussed.  # Contraception: Discussed that using condoms intermittently is not an effective form of birth control.  Patient  confirms that she does not want pregnancy.  Strongly advised we initiate some form of contraception.  We discussed various methods including pills, patch, ring, Depo, Nexplanon and IUDs.  She is appreciative of the information and she is given reference to [email or web address].  She will follow-up with me in about a month to further address contraception.  Advised to use condoms with every intercourse in the meantime.  # STD screening: Blood tests ordered and gonorrhea chlamydia sent off Pap.  If negative patient will see in my chart if positive will contact patient.  # Follow up in about 1 month to follow-up contraception.

## 2021-01-26 NOTE — NON-PROVIDER
Pt here as New Pt for Gyn exam  Good Phone #:[phone]  Pharmacy verified.  Last Pap:Pt states she never had one.  LMP:1/5/2021  Pt states would like to establish care.  Pt states would like to have a well woman exam with pap.

## 2021-01-27 LAB
C TRACH DNA GENITAL QL NAA+PROBE: NEGATIVE
CYTOLOGY REG CYTOL: NORMAL
N GONORRHOEA DNA GENITAL QL NAA+PROBE: NEGATIVE
SPECIMEN SOURCE: NORMAL

## 2022-01-06 ENCOUNTER — HOSPITAL ENCOUNTER (OUTPATIENT)
Facility: MEDICAL CENTER | Age: 27
End: 2022-01-06
Attending: PHYSICIAN ASSISTANT
Payer: COMMERCIAL

## 2022-01-06 ENCOUNTER — OFFICE VISIT (OUTPATIENT)
Dept: URGENT CARE | Facility: PHYSICIAN GROUP | Age: 27
End: 2022-01-06
Payer: COMMERCIAL

## 2022-01-06 VITALS
HEART RATE: 99 BPM | RESPIRATION RATE: 20 BRPM | SYSTOLIC BLOOD PRESSURE: 96 MMHG | BODY MASS INDEX: 27.48 KG/M2 | DIASTOLIC BLOOD PRESSURE: 58 MMHG | HEIGHT: 60 IN | WEIGHT: 140 LBS | OXYGEN SATURATION: 97 % | TEMPERATURE: 97.6 F

## 2022-01-06 DIAGNOSIS — R05.9 COUGH: ICD-10-CM

## 2022-01-06 DIAGNOSIS — Z20.828 CONTACT WITH AND (SUSPECTED) EXPOSURE TO OTHER VIRAL COMMUNICABLE DISEASES: ICD-10-CM

## 2022-01-06 PROCEDURE — U0003 INFECTIOUS AGENT DETECTION BY NUCLEIC ACID (DNA OR RNA); SEVERE ACUTE RESPIRATORY SYNDROME CORONAVIRUS 2 (SARS-COV-2) (CORONAVIRUS DISEASE [COVID-19]), AMPLIFIED PROBE TECHNIQUE, MAKING USE OF HIGH THROUGHPUT TECHNOLOGIES AS DESCRIBED BY CMS-2020-01-R: HCPCS

## 2022-01-06 PROCEDURE — 99213 OFFICE O/P EST LOW 20 MIN: CPT | Mod: CS | Performed by: PHYSICIAN ASSISTANT

## 2022-01-06 PROCEDURE — U0005 INFEC AGEN DETEC AMPLI PROBE: HCPCS

## 2022-01-06 ASSESSMENT — ENCOUNTER SYMPTOMS
DIARRHEA: 0
PALPITATIONS: 0
DIAPHORESIS: 0
CHILLS: 0
SHORTNESS OF BREATH: 0
FEVER: 0
ABDOMINAL PAIN: 0
HEADACHES: 0
SPUTUM PRODUCTION: 0
COUGH: 1
NAUSEA: 0
SORE THROAT: 0
MYALGIAS: 0
DIZZINESS: 0
VOMITING: 0
SINUS PAIN: 0
WHEEZING: 0

## 2022-01-07 DIAGNOSIS — R05.9 COUGH: ICD-10-CM

## 2022-01-07 DIAGNOSIS — Z20.828 CONTACT WITH AND (SUSPECTED) EXPOSURE TO OTHER VIRAL COMMUNICABLE DISEASES: ICD-10-CM

## 2022-01-07 LAB — COVID ORDER STATUS COVID19: NORMAL

## 2022-01-07 NOTE — PROGRESS NOTES
Subjective:   Gabriela Handy is a 26 y.o. female who presents for Cough (3x days) and Coronavirus Screening (exposure; brother)    HPI:  This is a very pleasant 26-year-old female presenting to clinic for coronavirus screening.  Her brother recently tested positive for COVID-19 and they have been around each other frequently.  Patient has been experiencing an intermittent dry nonproductive cough for the last 3 days.  Also is experiencing mild sinus congestion.  She has not been running a fever.  Denies any shortness of breath or chest pain.  No body aches or chills.  Took NyQuil last night which provided moderate improvement.  Has not had any OTC medications today.      Review of Systems   Constitutional: Negative for chills, diaphoresis, fever and malaise/fatigue.   HENT: Positive for congestion. Negative for ear pain, sinus pain and sore throat.    Respiratory: Positive for cough. Negative for sputum production, shortness of breath and wheezing.    Cardiovascular: Negative for chest pain and palpitations.   Gastrointestinal: Negative for abdominal pain, diarrhea, nausea and vomiting.   Musculoskeletal: Negative for myalgias.   Neurological: Negative for dizziness and headaches.   Endo/Heme/Allergies: Negative for environmental allergies.       Medications:    • This patient does not have an active medication from one of the medication groupers.    Allergies: Patient has no known allergies.    Problem List: Gabriela Handy does not have any pertinent problems on file.    Surgical History:  No past surgical history on file.    Past Social Hx: Gabriela Handy  reports that she has never smoked. She has never used smokeless tobacco. She reports current alcohol use. She reports previous drug use.     Past Family Hx:  Gabriela Handy family history is not on file.     Problem list, medications, and allergies reviewed by myself today in Epic.     Objective:     BP (!) 96/58 (BP Location: Right arm,  Patient Position: Sitting, BP Cuff Size: Adult)   Pulse 99   Temp 36.4 °C (97.6 °F) (Temporal)   Resp 20   Ht 1.524 m (5')   Wt 63.5 kg (140 lb)   SpO2 97%   BMI 27.34 kg/m²     Physical Exam  Constitutional:       General: She is not in acute distress.     Appearance: Normal appearance. She is not ill-appearing, toxic-appearing or diaphoretic.   HENT:      Head: Normocephalic and atraumatic.      Right Ear: Tympanic membrane, ear canal and external ear normal.      Left Ear: Tympanic membrane, ear canal and external ear normal.      Nose: Nose normal. No congestion or rhinorrhea.      Mouth/Throat:      Mouth: Mucous membranes are moist.      Pharynx: No oropharyngeal exudate or posterior oropharyngeal erythema.   Eyes:      Conjunctiva/sclera: Conjunctivae normal.   Cardiovascular:      Rate and Rhythm: Normal rate and regular rhythm.      Pulses: Normal pulses.      Heart sounds: Normal heart sounds.   Pulmonary:      Effort: Pulmonary effort is normal.      Breath sounds: Normal breath sounds. No wheezing, rhonchi or rales.   Musculoskeletal:      Cervical back: Normal range of motion. No muscular tenderness.   Lymphadenopathy:      Cervical: No cervical adenopathy.   Skin:     General: Skin is warm and dry.      Capillary Refill: Capillary refill takes less than 2 seconds.   Neurological:      Mental Status: She is alert.   Psychiatric:         Mood and Affect: Mood normal.         Thought Content: Thought content normal.           Assessment/Plan:     Comments/MDM:     Patient's vital signs are reassuring and they appear hemodynamically stable and do not require higher level care at this time  I discussed self isolation and provided printed instructions. Stay isolated until results are made available. May take 2-3 days.  Recommended supportive treatment including increased fluids and rest.  Use Tylenol and ibuprofen as needed for pain and fever.   I educated the patient on possibility of a  false-negative test and indications for repeat testing  I instructed the patient to try to follow up with their PCP (if applicable) for follow up care  I provided the patient the printed AVS which contains information about signing up for [employer]   I will contact the patient via [employer] with Covid results.  Red flag symptoms were discussed with the patient at length today.  If any of these symptoms present return to the clinic or nearest emergency department.    Please call with any questions or concerns.     Diagnosis and associated orders:     1. Cough  COVID/SARS CoV-2 PCR   2. Contact with and (suspected) exposure to other viral communicable diseases  COVID/SARS CoV-2 PCR            Differential diagnosis, natural history, supportive care, and indications for immediate follow-up discussed.    I personally reviewed prior external notes and test results pertinent to today's visit.     Advised the patient to follow-up with the primary care physician for recheck, reevaluation, and consideration of further management.    Please note that this dictation was created using voice recognition software. I have made reasonable attempt to correct obvious errors, but I expect that there are errors of grammar and possibly content that I did not discover before finalizing the note.    This note was electronically signed by AALIYAH Reyes PA-C

## 2022-01-08 LAB
SARS-COV-2 RNA RESP QL NAA+PROBE: DETECTED
SPECIMEN SOURCE: ABNORMAL

## 2022-07-08 ENCOUNTER — HOSPITAL ENCOUNTER (EMERGENCY)
Facility: MEDICAL CENTER | Age: 27
End: 2022-07-08
Attending: EMERGENCY MEDICINE
Payer: COMMERCIAL

## 2022-07-08 VITALS
RESPIRATION RATE: 18 BRPM | WEIGHT: 143.74 LBS | DIASTOLIC BLOOD PRESSURE: 53 MMHG | HEART RATE: 107 BPM | BODY MASS INDEX: 28.22 KG/M2 | OXYGEN SATURATION: 96 % | TEMPERATURE: 98.5 F | SYSTOLIC BLOOD PRESSURE: 93 MMHG | HEIGHT: 60 IN

## 2022-07-08 DIAGNOSIS — N12 PYELONEPHRITIS: ICD-10-CM

## 2022-07-08 LAB
APPEARANCE UR: ABNORMAL
BACTERIA #/AREA URNS HPF: ABNORMAL /HPF
BILIRUB UR QL STRIP.AUTO: ABNORMAL
COLOR UR: ABNORMAL
EPI CELLS #/AREA URNS HPF: ABNORMAL /HPF
GLUCOSE UR STRIP.AUTO-MCNC: NEGATIVE MG/DL
GRAN CASTS #/AREA URNS LPF: ABNORMAL /LPF
HYALINE CASTS #/AREA URNS LPF: ABNORMAL /LPF
KETONES UR STRIP.AUTO-MCNC: NEGATIVE MG/DL
LEUKOCYTE ESTERASE UR QL STRIP.AUTO: ABNORMAL
MICRO URNS: ABNORMAL
NITRITE UR QL STRIP.AUTO: NEGATIVE
PH UR STRIP.AUTO: 5 [PH] (ref 5–8)
PROT UR QL STRIP: 100 MG/DL
RBC # URNS HPF: ABNORMAL /HPF
RBC UR QL AUTO: ABNORMAL
SP GR UR STRIP.AUTO: 1.02
UROBILINOGEN UR STRIP.AUTO-MCNC: 1 MG/DL
WBC #/AREA URNS HPF: ABNORMAL /HPF

## 2022-07-08 PROCEDURE — 87086 URINE CULTURE/COLONY COUNT: CPT

## 2022-07-08 PROCEDURE — 700102 HCHG RX REV CODE 250 W/ 637 OVERRIDE(OP): Performed by: EMERGENCY MEDICINE

## 2022-07-08 PROCEDURE — 81001 URINALYSIS AUTO W/SCOPE: CPT

## 2022-07-08 PROCEDURE — 87186 SC STD MICRODIL/AGAR DIL: CPT

## 2022-07-08 PROCEDURE — 87077 CULTURE AEROBIC IDENTIFY: CPT

## 2022-07-08 PROCEDURE — A9270 NON-COVERED ITEM OR SERVICE: HCPCS | Performed by: EMERGENCY MEDICINE

## 2022-07-08 PROCEDURE — 99283 EMERGENCY DEPT VISIT LOW MDM: CPT

## 2022-07-08 RX ORDER — CEFDINIR 300 MG/1
300 CAPSULE ORAL ONCE
Status: COMPLETED | OUTPATIENT
Start: 2022-07-08 | End: 2022-07-08

## 2022-07-08 RX ORDER — CEFDINIR 300 MG/1
300 CAPSULE ORAL 2 TIMES DAILY
Qty: 28 CAPSULE | Refills: 0 | Status: SHIPPED | OUTPATIENT
Start: 2022-07-08 | End: 2022-07-22

## 2022-07-08 RX ADMIN — CEFDINIR 300 MG: 300 CAPSULE ORAL at 11:58

## 2022-07-08 NOTE — ED TRIAGE NOTES
Chief Complaint   Patient presents with   • Fever     Pt reports fevers since Wednesday. Pt endorses dysuria and flank pain. Pt thinks she has a UTI. Pt also reports finger and lip numbness/tingling.      BP (!) 94/54   Pulse (!) 113   Temp 36.4 °C (97.6 °F) (Temporal)   Resp 16   Ht 1.524 m (5')   Wt 65.2 kg (143 lb 11.8 oz)   LMP 07/06/2022 (Exact Date)   SpO2 96%   BMI 28.07 kg/m²     Pt is ambulatory in and out of triage. Appropriate PPE worn throughout entire encounter. Pt placed back in the lobby and educated about triage process.    Urine sent to lab.

## 2022-07-08 NOTE — ED NOTES
Discharge teaching and paperwork provided regarding pyelonephritis and all questions/concerns answered. VSS, assessment stable. Given information regarding home care and reasons to follow up with ED or primary MD. Patient provided with omnicef Rx. Patient discharged to the care of partner and ambulatory out of the ED.

## 2022-07-08 NOTE — ED NOTES
BP 87/52. Pt continues to deny feeling lightheaded, able to ambulate steadily. Dr. Gonzales aware. Up for D/C.

## 2022-07-08 NOTE — ED PROVIDER NOTES
ED Provider Note    CHIEF COMPLAINT  Chief Complaint   Patient presents with   • Fever     Pt reports fevers since Wednesday. Pt endorses dysuria and flank pain. Pt thinks she has a UTI. Pt also reports finger and lip numbness/tingling.        HPI  Gabriela Handy is a 27 y.o. female who presents for evaluation of a fever associated burning with urination for the past 2 days.  Temperature has been fluctuating as high as 102 degrees.  She is had some chills and back pain.  No abdominal pain.  No diarrhea.  Nausea.  Currently states that she feels tingles around her mouth and hands.  She expresses concerns about a UTI.  Otherwise healthy with no medical problems, no other complaints    REVIEW OF SYSTEMS  Negative for, rash, chest pain, dyspnea, headache, focal weakness, focal numbness, focal tingling. All other systems are negative.     PAST MEDICAL HISTORY  History reviewed. No pertinent past medical history.    FAMILY HISTORY  No family history on file.    SOCIAL HISTORY  Social History     Tobacco Use   • Smoking status: Never Smoker   • Smokeless tobacco: Never Used   Vaping Use   • Vaping Use: Never used   Substance Use Topics   • Alcohol use: Yes     Comment: occ   • Drug use: Not Currently       SURGICAL HISTORY  History reviewed. No pertinent surgical history.    CURRENT MEDICATIONS  I personally reviewed the medication list in the charting documentation.     ALLERGIES  No Known Allergies    MEDICAL RECORD  I have reviewed patient's medical record and pertinent results are listed above.      PHYSICAL EXAM  VITAL SIGNS: BP (!) 87/52   Pulse (!) 107   Temp 36.9 °C (98.5 °F) (Temporal)   Resp 18   Ht 1.524 m (5')   Wt 65.2 kg (143 lb 11.8 oz)   LMP 07/06/2022 (Exact Date)   SpO2 96%   BMI 28.07 kg/m²    Constitutional: Well appearing patient in no acute distress.  Awake and alert, not toxic nor ill in appearance.  HENT: Normocephalic, no obvious evidence of acute trauma.  Eyes: No scleral icterus.  Normal conjunctiva   Neck: Comfortable movement without any obvious restriction in the range of motion.  Cardiovascular: Upon ascultation I appreciate a regular heart rhythm and a mildly tachycardic rate.   Thorax & Lungs: Normal nonlabored respirations.  Upon application of the stethoscope for auscultation I find there to be no associated chest wall tenderness.  I appreciate no wheezing, rhonchi or rales. There is normal air movement.    Abdomen: The abdomen is not visibly distended. Upon palpation, I find it to be without tenderness.  No mass appreciated.  Skin: The exposed portions of skin reveal no obvious rash or other abnormalities.  Extremities/Musculoskeletal: No obvious sign of acute trauma. No asymmetric calf tenderness or edema.   Neurologic: Alert & oriented. No focal deficits observed.   Psychiatric: Normal affect appropriate for the clinical situation.    DIAGNOSTIC STUDIES / PROCEDURES    LABS/EKGs  Results for orders placed or performed during the hospital encounter of 07/08/22   URINALYSIS CULTURE, IF INDICATED    Specimen: Urine, Clean Catch   Result Value Ref Range    Color DK Yellow     Character Cloudy (A)     Specific Gravity 1.018 <1.035    Ph 5.0 5.0 - 8.0    Glucose Negative Negative mg/dL    Ketones Negative Negative mg/dL    Protein 100 (A) Negative mg/dL    Bilirubin Small (A) Negative    Urobilinogen, Urine 1.0 Negative    Nitrite Negative Negative    Leukocyte Esterase Moderate (A) Negative    Occult Blood Large (A) Negative    Micro Urine Req Microscopic    URINE MICROSCOPIC (W/UA)   Result Value Ref Range    WBC  (A) /hpf    RBC 5-10 (A) /hpf    Bacteria Many (A) None /hpf    Epithelial Cells Few /hpf    Hyaline Cast 11-20 (A) /lpf    Granular Casts 0-2 /lpf       COURSE & MEDICAL DECISION MAKING  I have reviewed any medical record information, laboratory studies and radiographic results as noted above.    Encounter Summary: This is a very pleasant 27 y.o. female who  unfortunately required evaluation in the emergency department today with with UTI symptoms, this is been associate with a fever for the past 2 or 3 days, on exam she is tachycardic with a soft blood pressure but appears very well.  She is not lightheaded nor dizzy when she stands, she actually ambulated from the waiting room back to purple pod without difficulty.  She describes some intermittent back pain, states is more associated with chills but given her evidence of UTI here with her urinalysis and microscopy we will treat her for pyelonephritis with a 2-week course of Omnicef.  She is healthy, well-appearing, she is appropriate for discharge and outpatient management.  First dose of antibiotics administered in the emergency department.  Strict return instructions have been provided      DISPOSITION: Discharged home in stable condition      FINAL IMPRESSION  1. Pyelonephritis           This dictation was created using voice recognition software. The accuracy of the dictation is limited to the abilities of the software. I expect there may be some errors of grammar and possibly content. The nursing notes were reviewed and certain aspects of this information were incorporated into this note.    Electronically signed by: Speedy Gonzales M.D., 7/8/2022 11:41 AM

## 2022-07-10 ENCOUNTER — OFFICE VISIT (OUTPATIENT)
Dept: URGENT CARE | Facility: PHYSICIAN GROUP | Age: 27
End: 2022-07-10
Payer: COMMERCIAL

## 2022-07-10 ENCOUNTER — HOSPITAL ENCOUNTER (OUTPATIENT)
Facility: MEDICAL CENTER | Age: 27
End: 2022-07-10
Attending: FAMILY MEDICINE
Payer: COMMERCIAL

## 2022-07-10 VITALS
RESPIRATION RATE: 20 BRPM | OXYGEN SATURATION: 96 % | DIASTOLIC BLOOD PRESSURE: 64 MMHG | WEIGHT: 140 LBS | TEMPERATURE: 97.9 F | SYSTOLIC BLOOD PRESSURE: 92 MMHG | BODY MASS INDEX: 27.48 KG/M2 | HEART RATE: 88 BPM | HEIGHT: 60 IN

## 2022-07-10 DIAGNOSIS — Z03.818 ENCOUNTER FOR PATIENT CONCERN ABOUT EXPOSURE TO INFECTIOUS ORGANISM: ICD-10-CM

## 2022-07-10 DIAGNOSIS — R51.9 NONINTRACTABLE HEADACHE, UNSPECIFIED CHRONICITY PATTERN, UNSPECIFIED HEADACHE TYPE: ICD-10-CM

## 2022-07-10 PROCEDURE — U0003 INFECTIOUS AGENT DETECTION BY NUCLEIC ACID (DNA OR RNA); SEVERE ACUTE RESPIRATORY SYNDROME CORONAVIRUS 2 (SARS-COV-2) (CORONAVIRUS DISEASE [COVID-19]), AMPLIFIED PROBE TECHNIQUE, MAKING USE OF HIGH THROUGHPUT TECHNOLOGIES AS DESCRIBED BY CMS-2020-01-R: HCPCS

## 2022-07-10 PROCEDURE — 99213 OFFICE O/P EST LOW 20 MIN: CPT | Mod: CS | Performed by: FAMILY MEDICINE

## 2022-07-10 PROCEDURE — U0005 INFEC AGEN DETEC AMPLI PROBE: HCPCS

## 2022-07-10 RX ORDER — KETOROLAC TROMETHAMINE 30 MG/ML
30 INJECTION, SOLUTION INTRAMUSCULAR; INTRAVENOUS ONCE
Status: DISCONTINUED | OUTPATIENT
Start: 2022-07-10 | End: 2022-07-10

## 2022-07-10 RX ORDER — KETOROLAC TROMETHAMINE 30 MG/ML
30 INJECTION, SOLUTION INTRAMUSCULAR; INTRAVENOUS ONCE
Status: COMPLETED | OUTPATIENT
Start: 2022-07-10 | End: 2022-07-10

## 2022-07-10 RX ADMIN — KETOROLAC TROMETHAMINE 30 MG: 30 INJECTION, SOLUTION INTRAMUSCULAR; INTRAVENOUS at 15:22

## 2022-07-11 DIAGNOSIS — Z03.818 ENCOUNTER FOR PATIENT CONCERN ABOUT EXPOSURE TO INFECTIOUS ORGANISM: ICD-10-CM

## 2022-07-11 LAB
COVID ORDER STATUS COVID19: NORMAL
SARS-COV-2 RNA RESP QL NAA+PROBE: NOTDETECTED
SPECIMEN SOURCE: NORMAL

## 2022-07-12 LAB
BACTERIA UR CULT: ABNORMAL
BACTERIA UR CULT: ABNORMAL
SIGNIFICANT IND 70042: ABNORMAL
SITE SITE: ABNORMAL
SOURCE SOURCE: ABNORMAL

## 2022-07-14 NOTE — ED NOTES
ED Positive Culture Follow-up/Notification Note:    Date: 7/14/2022     Patient seen in the ED on 7/8/2022 for dysuria and flank pain with fever up to 102 F.   1. Pyelonephritis       Discharge Medication List as of 7/8/2022 12:00 PM      START taking these medications    Details   cefdinir (OMNICEF) 300 MG Cap Take 1 Capsule by mouth 2 times a day for 14 days., Disp-28 Capsule, R-0, Normal             Allergies: Patient has no known allergies.     Vitals:    07/08/22 1027 07/08/22 1137 07/08/22 1144 07/08/22 1204   BP:  (!) 86/54 (!) 87/52 (!) 93/53   Pulse:  (!) 116 (!) 107    Resp:   18    Temp:   36.9 °C (98.5 °F)    TempSrc:   Temporal    SpO2:  99% 96%    Weight: 65.2 kg (143 lb 11.8 oz)      Height: 1.524 m (5')          Final cultures:   Results     Procedure Component Value Units Date/Time    URINE CULTURE(NEW) [[id number]]  (Abnormal)  (Susceptibility) Collected: 07/08/22 1005    Order Status: Completed Specimen: Urine Updated: 07/12/22 1210     Significant Indicator POS     Source UR     Site -     Culture Result Usual urogenital verena 10-50,000 cfu/mL      Escherichia coli  >100,000 cfu/mL      Narrative:      Indication for culture:->Patient WITHOUT an indwelling Hogan  catheter in place with new onset of Dysuria, Frequency,  Urgency, and/or Suprapubic pain    Susceptibility     Escherichia coli (1)     Antibiotic Interpretation Microscan   Method Status    Amikacin  [*]  Sensitive <=16 mcg/mL WILLIAM Final    Ampicillin Sensitive <=8 mcg/mL WILLIAM Final    Amoxicillin/CA  [*]  Sensitive <=8/4 mcg/mL WILLIAM Final    Aztreonam  [*]  Sensitive <=4 mcg/mL WILLIAM Final    Ceftolozane+Tazobactam  [*]  Sensitive <=2 mcg/mL WILLIAM Final    Ceftriaxone Sensitive <=1 mcg/mL WILLIAM Final    Ceftazidime  [*]  Sensitive <=1 mcg/mL WILLIAM Final    Cefazolin Sensitive <=2 mcg/mL WILLIAM Final     Breakpoints when Cefazolin is used for therapy of infections  other than uncomplicated UTIs due to Enterobacterales are as  follows:  WILLIAM and  Interpretation:  <=2 S  4 I  >=8 R         Ciprofloxacin Resistant 1 mcg/mL WILLIAM Final    Cefepime Sensitive <=2 mcg/mL WILLIAM Final    Cefuroxime Sensitive 8 mcg/mL WILLIAM Final    Ceftazidime+Avibactam  [*]  Sensitive <=4 mcg/mL WILLIAM Final    Ampicillin/sulbactam Sensitive <=4/2 mcg/mL WILLIAM Final    Ertapenem  [*]  Sensitive <=0.5 mcg/mL WILLIAM Final    Tobramycin Sensitive <=2 mcg/mL WILLIAM Final    Nitrofurantoin Sensitive <=32 mcg/mL WILLIAM Final    Gentamicin Sensitive <=2 mcg/mL WILLIAM Final    Imipenem  [*]  Sensitive <=1 mcg/mL WILLIAM Final    Levofloxacin Sensitive <=0.5 mcg/mL WILLIAM Final    Meropenem  [*]  Sensitive <=1 mcg/mL WILLIAM Final    Meropenem/Vaborbactam  [*]  Sensitive <=2 mcg/mL WILLIAM Final    Minocycline Sensitive <=4 mcg/mL WILLIAM Final    Pip/Tazobactam Sensitive <=8 mcg/mL WILLIAM Final    Trimeth/Sulfa Sensitive <=0.5/9.5 mcg/mL WILLIAM Final    Tetracycline  [*]  Sensitive <=4 mcg/mL WILLIAM Final    Tigecycline Sensitive <=2 mcg/mL WILLIAM Final           [*]  Suppressed Antibiotic                 URINALYSIS CULTURE, IF INDICATED [[id number]]  (Abnormal) Collected: 07/08/22 1005    Order Status: Completed Specimen: Urine, Clean Catch Updated: 07/08/22 1119     Color DK Yellow     Character Cloudy     Specific Gravity 1.018     Ph 5.0     Glucose Negative mg/dL      Ketones Negative mg/dL      Protein 100 mg/dL      Bilirubin Small     Urobilinogen, Urine 1.0     Nitrite Negative     Leukocyte Esterase Moderate     Occult Blood Large     Micro Urine Req Microscopic    Narrative:      Indication for culture:->Patient WITHOUT an indwelling Hogan  catheter in place with new onset of Dysuria, Frequency,  Urgency, and/or Suprapubic pain          Plan:   Appropriate antibiotic therapy prescribed. Cefdinir does has limited oral bioavailability and while the E. coli is susceptible to this antibiotic, it may not achieve desired levels to treat a pyelonephritis. I have attempted to contact the patient to assure they are improving on the cefdinir  but there was no answer. I have left a message for her to return my call for results. If she is improving will continue the cefdinir. If not improving, will change antibiotic to Bactrim DS I po BID x 7 days.      aVleria March, PharmD

## 2022-08-03 ENCOUNTER — OFFICE VISIT (OUTPATIENT)
Dept: MEDICAL GROUP | Facility: MEDICAL CENTER | Age: 27
End: 2022-08-03
Payer: COMMERCIAL

## 2022-08-03 VITALS
HEIGHT: 60 IN | OXYGEN SATURATION: 98 % | TEMPERATURE: 97.9 F | HEART RATE: 69 BPM | WEIGHT: 143.2 LBS | DIASTOLIC BLOOD PRESSURE: 52 MMHG | BODY MASS INDEX: 28.11 KG/M2 | SYSTOLIC BLOOD PRESSURE: 90 MMHG

## 2022-08-03 DIAGNOSIS — N30.01 ACUTE CYSTITIS WITH HEMATURIA: ICD-10-CM

## 2022-08-03 DIAGNOSIS — Z00.00 ANNUAL PHYSICAL EXAM: ICD-10-CM

## 2022-08-03 PROBLEM — R23.4 BREAST SKIN CHANGES: Status: RESOLVED | Noted: 2020-09-24 | Resolved: 2022-08-03

## 2022-08-03 PROCEDURE — 99395 PREV VISIT EST AGE 18-39: CPT | Performed by: PHYSICIAN ASSISTANT

## 2022-08-03 RX ORDER — NITROFURANTOIN 25; 75 MG/1; MG/1
100 CAPSULE ORAL 2 TIMES DAILY
Qty: 10 CAPSULE | Refills: 0 | Status: SHIPPED | OUTPATIENT
Start: 2022-08-03 | End: 2022-08-08

## 2022-08-03 ASSESSMENT — PATIENT HEALTH QUESTIONNAIRE - PHQ9: CLINICAL INTERPRETATION OF PHQ2 SCORE: 0

## 2022-08-04 NOTE — ASSESSMENT & PLAN NOTE
This is a pleasant 27-year-old female who was seen at the ER earlier last month and diagnosed with urinary tract infection.  At the time she was feverish and had other symptoms.  She also followed up then with urgent care for headache that has been continuing after the visit from the ER.  She was found to have E. coli.  She was placed on Omnicef.  Her symptoms did improve.  She has had no symptoms for many weeks.  Denies any burning urination.  No hematuria.  She had an UTI in 2019 which showed E. coli as well.  At that point she was treated with Macrobid.  That was helpful as well.  She does urinate before and after intercourse.  She pushes fluids.  She would like to have her labs drawn again to check her urine status.

## 2022-08-04 NOTE — PROGRESS NOTES
Subjective:   Gabriela Handy is a 27 y.o. female here today for annual physical.    Annual physical exam  This is a pleasant 27-year-old female who was seen at the ER earlier last month and diagnosed with urinary tract infection.  At the time she was feverish and had other symptoms.  She also followed up then with urgent care for headache that has been continuing after the visit from the ER.  She was found to have E. coli.  She was placed on Omnicef.  Her symptoms did improve.  She has had no symptoms for many weeks.  Denies any burning urination.  No hematuria.  She had an UTI in 2019 which showed E. coli as well.  At that point she was treated with Macrobid.  That was helpful as well.  She does urinate before and after intercourse.  She pushes fluids.  She would like to have her labs drawn again to check her urine status.       Current medicines (including changes today)  Current Outpatient Medications   Medication Sig Dispense Refill   • nitrofurantoin (MACROBID) 100 MG Cap Take 1 Capsule by mouth 2 times a day for 5 days. 10 Capsule 0     No current facility-administered medications for this visit.     She  has no past medical history on file.    Social History and Family History were reviewed and updated.    ROS   No chest pain, no shortness of breath, no abdominal pain and all other systems were reviewed and are negative.       Objective:     BP (!) 90/52 (BP Location: Right arm, Patient Position: Sitting, BP Cuff Size: Adult)   Pulse 69   Temp 36.6 °C (97.9 °F) (Temporal)   Ht 1.524 m (5')   Wt 65 kg (143 lb 3.2 oz)   SpO2 98%  Body mass index is 27.97 kg/m².   Physical Exam:  Constitutional: Alert, no distress.  Skin: Warm, dry, good turgor, no rashes in visible areas.  Eye: Equal, round and reactive, conjunctiva clear, lids normal.  ENMT: Lips without lesions, good dentition, oropharynx clear.  Neck: Trachea midline, no masses.   Lymph: No cervical or supraclavicular lymphadenopathy  Respiratory:  Unlabored respiratory effort, lungs appear clear, no wheezes.  Psych: Alert and oriented x3, normal affect and mood.        Assessment and Plan:   The following treatment plan was discussed    1. Annual physical exam  Generally healthy female.  Order labs.  Fast 8 hours.  She will be contacted with the results.  - CBC WITH DIFFERENTIAL; Future  - Comp Metabolic Panel; Future  - TSH WITH REFLEX TO FT4; Future  - HEMOGLOBIN A1C; Future  - Lipid Profile; Future    2. Acute cystitis with hematuria  Resolved infection from early in July.  Advise no need to repeat a urine culture.  She is asymptomatic.  We will provide her Macrobid in case she develops similar symptoms of UTI since she is experiencing twice before.  Advised though if she starts the medication and request to have a urine culture performed that she likely will not grow any bacteria.  I do not want to see her though getting into any circumstances where she has pyelonephritis from an untreated UTI.  - nitrofurantoin (MACROBID) 100 MG Cap; Take 1 Capsule by mouth 2 times a day for 5 days.  Dispense: 10 Capsule; Refill: 0         Followup: Return in about 1 year (around 8/3/2023), or if symptoms worsen or fail to improve.    Please note that this dictation was created using voice recognition software. I have made every reasonable attempt to correct obvious errors, but I expect that there are errors of grammar and possibly content that I did not discover before finalizing the note.